# Patient Record
Sex: MALE | Race: BLACK OR AFRICAN AMERICAN | NOT HISPANIC OR LATINO | URBAN - METROPOLITAN AREA
[De-identification: names, ages, dates, MRNs, and addresses within clinical notes are randomized per-mention and may not be internally consistent; named-entity substitution may affect disease eponyms.]

---

## 2020-04-16 ENCOUNTER — EMERGENCY (EMERGENCY)
Facility: HOSPITAL | Age: 30
LOS: 1 days | Discharge: HOME | End: 2020-04-16
Attending: EMERGENCY MEDICINE | Admitting: EMERGENCY MEDICINE
Payer: SELF-PAY

## 2020-04-16 VITALS
DIASTOLIC BLOOD PRESSURE: 69 MMHG | WEIGHT: 154.32 LBS | SYSTOLIC BLOOD PRESSURE: 129 MMHG | OXYGEN SATURATION: 99 % | RESPIRATION RATE: 17 BRPM | HEIGHT: 67 IN | TEMPERATURE: 99 F | HEART RATE: 100 BPM

## 2020-04-16 PROCEDURE — 99283 EMERGENCY DEPT VISIT LOW MDM: CPT

## 2020-04-16 RX ORDER — ACETAMINOPHEN 500 MG
975 TABLET ORAL ONCE
Refills: 0 | Status: COMPLETED | OUTPATIENT
Start: 2020-04-16 | End: 2020-04-16

## 2020-04-16 RX ORDER — PERMETHRIN CREAM 5% W/W 50 MG/G
1 CREAM TOPICAL ONCE
Refills: 0 | Status: COMPLETED | OUTPATIENT
Start: 2020-04-16 | End: 2020-04-16

## 2020-04-16 RX ORDER — PERMETHRIN CREAM 5% W/W 50 MG/G
1 CREAM TOPICAL
Qty: 1 | Refills: 0
Start: 2020-04-16

## 2020-04-16 RX ADMIN — Medication 975 MILLIGRAM(S): at 18:03

## 2020-04-16 RX ADMIN — PERMETHRIN CREAM 5% W/W 1 APPLICATION(S): 50 CREAM TOPICAL at 18:03

## 2020-04-16 NOTE — ED PROVIDER NOTE - NSFOLLOWUPINSTRUCTIONS_ED_ALL_ED_FT
Please apply permethrin cream and leave on for 8-12h then rinse.     Return to the ER for medical emergencies.

## 2020-04-16 NOTE — ED PROVIDER NOTE - PATIENT PORTAL LINK FT
You can access the FollowMyHealth Patient Portal offered by [facility] by registering at the following website: [email or web address]. By joining [employer]’s FollowMyHealth portal, you will also be able to view your health information using other applications (apps) compatible with our system.
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
## 2020-04-20 DIAGNOSIS — L29.9 PRURITUS, UNSPECIFIED: ICD-10-CM

## 2020-04-20 DIAGNOSIS — Z59.0 HOMELESSNESS: ICD-10-CM

## 2020-04-20 SDOH — ECONOMIC STABILITY - HOUSING INSECURITY: HOMELESSNESS: Z59.0

## 2020-04-28 ENCOUNTER — EMERGENCY (EMERGENCY)
Facility: HOSPITAL | Age: 30
LOS: 0 days | Discharge: HOME | End: 2020-04-28
Attending: EMERGENCY MEDICINE
Payer: SELF-PAY

## 2020-04-28 VITALS
OXYGEN SATURATION: 97 % | WEIGHT: 169.98 LBS | RESPIRATION RATE: 16 BRPM | HEIGHT: 73 IN | HEART RATE: 105 BPM | DIASTOLIC BLOOD PRESSURE: 66 MMHG | SYSTOLIC BLOOD PRESSURE: 128 MMHG | TEMPERATURE: 100 F

## 2020-04-28 PROCEDURE — 99284 EMERGENCY DEPT VISIT MOD MDM: CPT
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
## 2020-04-28 NOTE — ED PROVIDER NOTE - MUSCULOSKELETAL, MLM
Spine appears normal, range of motion is not limited, no muscle or joint tenderness. no evident bony deformities or edema

## 2020-04-28 NOTE — ED PROVIDER NOTE - PATIENT PORTAL LINK FT
You can access the FollowMyHealth Patient Portal offered by [facility] by registering at the following website: [email or web address]. By joining [employer]’s FollowMyHealth portal, you will also be able to view your health information using other applications (apps) compatible with our system.

## 2020-04-28 NOTE — ED PROVIDER NOTE - NSFOLLOWUPINSTRUCTIONS_ED_ALL_ED_FT
You can take tylenol 650mg every 8 hours as needed for pain.   Return to ED if you experience: high fevers, chest pain, shortness of breath and other concerning symptoms.

## 2020-04-28 NOTE — ED PROVIDER NOTE - CLINICAL SUMMARY MEDICAL DECISION MAKING FREE TEXT BOX
30yo male presents complaining of full body pain x <1hr. Pt appears intoxicated. No evidence of injury. Follow commands, answer appropriately. Ambulatory. Will wait for clinical sobriety.

## 2020-04-28 NOTE — ED PROVIDER NOTE - ATTENDING CONTRIBUTION TO CARE
I have seen the patient with the PA and agree with above examination and assessment and plan with the following addendum:        Focused PE:   General: NAD, alert and oriented  Head: Normocephalic, atraumatic  Eyes: PERRLA, EOMI  Cardiac: RRR, no murmurs, rubs or gallops  Resp: CTA, no wheezes, rales or ronchi  GI: Nondistended, nontender, no rebound or guarding  Neuro: Alert and oriented, no focal deficits. Normal gait. Clinically sober.  Ext: Non edematous, nontender.

## 2020-04-29 ENCOUNTER — EMERGENCY (EMERGENCY)
Facility: HOSPITAL | Age: 30
LOS: 1 days | Discharge: HOME | End: 2020-04-29
Attending: EMERGENCY MEDICINE | Admitting: EMERGENCY MEDICINE
Payer: SELF-PAY

## 2020-04-29 VITALS
SYSTOLIC BLOOD PRESSURE: 151 MMHG | WEIGHT: 145.06 LBS | HEART RATE: 96 BPM | RESPIRATION RATE: 16 BRPM | OXYGEN SATURATION: 98 % | DIASTOLIC BLOOD PRESSURE: 82 MMHG | TEMPERATURE: 98 F

## 2020-04-29 DIAGNOSIS — Z00.00 ENCOUNTER FOR GENERAL ADULT MEDICAL EXAMINATION WITHOUT ABNORMAL FINDINGS: ICD-10-CM

## 2020-04-29 DIAGNOSIS — F10.10 ALCOHOL ABUSE, UNCOMPLICATED: ICD-10-CM

## 2020-04-29 PROCEDURE — 99283 EMERGENCY DEPT VISIT LOW MDM: CPT

## 2020-04-29 RX ORDER — PERMETHRIN CREAM 5% W/W 50 MG/G
1 CREAM TOPICAL ONCE
Refills: 0 | Status: COMPLETED | OUTPATIENT
Start: 2020-04-29 | End: 2020-04-29

## 2020-04-29 RX ADMIN — PERMETHRIN CREAM 5% W/W 1 APPLICATION(S): 50 CREAM TOPICAL at 16:53

## 2020-04-29 NOTE — ED PROVIDER NOTE - CLINICAL SUMMARY MEDICAL DECISION MAKING FREE TEXT BOX
30 yo homeless M with PMH of HTN presents for "cream" to treat scabies and a shower. No fevers, chills, cough. SOB. No other complaints. Pt given access to shower and given permethrin cream to take with him.

## 2020-04-29 NOTE — ED PROVIDER NOTE - PATIENT PORTAL LINK FT
You can access the FollowMyHealth Patient Portal offered by [facility] by registering at the following website: [email or web address]. By joining [employer]’s FollowMyHealth portal, you will also be able to view your health information using other applications (apps) compatible with our system.

## 2020-04-29 NOTE — ED ADULT NURSE NOTE - CHIEF COMPLAINT QUOTE
pt BIBA from [place] at [address] requesting med refill "for my scabies. I've had them for 6-7 months but I didn't clean them right so I've been itching all over"

## 2020-04-29 NOTE — ED PROVIDER NOTE - NSFOLLOWUPINSTRUCTIONS_ED_ALL_ED_FT
Scabies, Adult     Scabies is a skin condition that happens when very small insects get under the skin (infestation). This causes a rash and severe itchiness. Scabies can spread from person to person (is contagious). If you get scabies, it is common for others in your household to get scabies too.  With proper treatment, symptoms usually go away in 2–4 weeks. Scabies usually does not cause lasting problems.  What are the causes?  This condition is caused by tiny mites (Sarcoptes scabiei, or human itch mites) that can only be seen with a microscope. The mites get into the top layer of skin and lay eggs. Scabies can spread from person to person through:  Close contact with a person who has scabies.Sharing or having contact with infested items, such as towels, bedding, or clothing.What increases the risk?  The following factors may make you more likely to develop this condition:  Living in a nursing home or other extended care facility.Having sexual contact with a partner who has scabies.Caring for others who are at increased risk for scabies.What are the signs or symptoms?  Symptoms of this condition include:  Severe itchiness. This is often worse at night.A rash that includes tiny red bumps or blisters. The rash commonly occurs on the hands, wrists, elbows, armpits, chest, waist, groin, or buttocks. The bumps may form a line (burrow) in some areas.Skin irritation. This can include scaly patches or sores.How is this diagnosed?  This condition may be diagnosed based on:  A physical exam of the skin.A skin test. Your health care provider may take a sample of your affected skin (skin scraping) and have it examined under a microscope for signs of mites.How is this treated?  This condition may be treated with:  Medicated cream or lotion that kills the mites. This is spread on the entire body and left on for several hours. Usually, one treatment with medicated cream or lotion is enough to kill all the mites. In severe cases, the treatment may need to be repeated.Medicated cream that relieves itching.Medicines taken by mouth (orally) that:  Relieve itching.Reduce the swelling and redness.Kill the mites. This treatment may be done in severe cases.Follow these instructions at home:  Medicines        Take or apply over-the-counter and prescription medicines as told by your health care provider.Apply medicated cream or lotion as told by your health care provider.Do not wash off the medicated cream or lotion until the necessary amount of time has passed.Skin care        Avoid scratching the affected areas of your skin.Keep your fingernails closely trimmed to reduce injury from scratching.Take cool baths or apply cool washcloths to your skin to help reduce itching.General instructions     Clean all items that you recently had contact with, including bedding, clothing, and furniture. Do this on the same day that you start treatment.  Dry clean items, or use hot water to wash items. Dry items on the hot dry cycle.Place items that cannot be washed into closed, airtight plastic bags for at least 3 days. The mites cannot live for more than 3 days away from human skin.Vacuum furniture and mattresses that you use.Make sure that other people who may have been infested are examined by a health care provider. These include members of your household and anyone who may have had contact with infested items.Keep all follow-up visits as told by your health care provider. This is important.Contact a health care provider if:  You have itching that does not go away after 4 weeks of treatment.You continue to develop new bumps or burrows.You have redness, swelling, or pain in your rash area after treatment.You have fluid, blood, or pus coming from your rash.Summary  Scabies is a skin condition that causes a rash and severe itchiness.This condition is caused by tiny mites that get into the top layer of the skin and lay eggs.Scabies can spread from person to person.Follow treatments as recommended by your health care provider.Clean all items that you recently had contact with.This information is not intended to replace advice given to you by your health care provider. Make sure you discuss any questions you have with your health care provider.

## 2020-04-29 NOTE — ED ADULT TRIAGE NOTE - CHIEF COMPLAINT QUOTE
pt BIBA from [place] at [address] requesting med refill "for my scabies. I've had them for 6-7 months but I didn't clean them right so I've been itching all over"

## 2020-04-29 NOTE — ED PROVIDER NOTE - OBJECTIVE STATEMENT
30 yo homeless M with PMH of HTN presents for "cream" to treat scabies and a shower. No fevers, chills, cough. SOB. No other complaints.

## 2020-05-03 DIAGNOSIS — B86 SCABIES: ICD-10-CM

## 2020-05-03 DIAGNOSIS — Z76.0 ENCOUNTER FOR ISSUE OF REPEAT PRESCRIPTION: ICD-10-CM

## 2022-03-05 NOTE — ED PROVIDER NOTE - NSCAREINITIATED _GEN_ER
Chiquita Sorto(Attending)
please follow up with your doctor in 2-3 days.   take medications as prescribed.    drink plenty of fluids.   may use ice or heating pads for comfort.   return to ED for any concerns

## 2023-09-20 ENCOUNTER — EMERGENCY (EMERGENCY)
Facility: HOSPITAL | Age: 33
LOS: 1 days | Discharge: HOME | End: 2023-09-20
Attending: EMERGENCY MEDICINE | Admitting: EMERGENCY MEDICINE
Payer: MEDICAID

## 2023-09-20 VITALS
RESPIRATION RATE: 18 BRPM | SYSTOLIC BLOOD PRESSURE: 100 MMHG | DIASTOLIC BLOOD PRESSURE: 67 MMHG | OXYGEN SATURATION: 99 % | TEMPERATURE: 98 F | HEART RATE: 72 BPM

## 2023-09-20 PROCEDURE — 99283 EMERGENCY DEPT VISIT LOW MDM: CPT

## 2023-09-20 RX ORDER — PERMETHRIN CREAM 5% W/W 50 MG/G
1 CREAM TOPICAL ONCE
Refills: 0 | Status: COMPLETED | OUTPATIENT
Start: 2023-09-20 | End: 2023-09-20

## 2023-09-20 RX ADMIN — PERMETHRIN CREAM 5% W/W 1 APPLICATION(S): 50 CREAM TOPICAL at 18:57

## 2023-09-20 NOTE — ED ADULT NURSE NOTE - OBJECTIVE STATEMENT
Patient presents to ED c/o rash to body and atraumatic pain to hands and feet. Denies cp, sob, cough. Patient aox4, ambulatory with steady gait, spont unlabored breathing on ra. Patient given food as per request.

## 2023-09-20 NOTE — ED PROVIDER NOTE - NSFOLLOWUPINSTRUCTIONS_ED_ALL_ED_FT
-PLEASE FOLLOW-UP WITH YOUR PRIMARY CARE DOCTOR IN 1-2 DAYS.  BRING ALL PAPERWORK FROM TODAY'S VISIT TO YOUR FOLLOW-UP VISIT.    -APPLY CREAM TO ENTIRE BODY AND LET REST FOR 8 HOURS.  AFTER THAT SHOWER CREAM OFF.  IF ITCHING PERSIST, PLEASE RETURN FOR RE-CHECK.  -PLEASE RETURN TO THE ER IMMEDIATELY OR CALL 911 FOR ANY HIGH FEVER, TROUBLE BREATHING, VOMITING, SEVERE PAIN, OR ANY OTHER CONCERNS.

## 2023-09-20 NOTE — ED ADULT NURSE NOTE - NSFALLUNIVINTERV_ED_ALL_ED
Bed/Stretcher in lowest position, wheels locked, appropriate side rails in place/Call bell, personal items and telephone in reach/Instruct patient to call for assistance before getting out of bed/chair/stretcher/Non-slip footwear applied when patient is off stretcher/[place] to call system/Physically safe environment - no spills, clutter or unnecessary equipment/Purposeful proactive rounding/Room/bathroom lighting operational, light cord in reach

## 2023-09-20 NOTE — ED PROVIDER NOTE - PATIENT PORTAL LINK FT
You can access the FollowMyHealth Patient Portal offered by [facility] by registering at the following website: [email or web address]. By joining [employer]’s FollowMyHealth portal, you will also be able to view your health information using other applications (apps) compatible with our system.

## 2023-09-20 NOTE — ED PROVIDER NOTE - PHYSICAL EXAMINATION
VITAL SIGNS: I have reviewed nursing notes and confirm.  CONST: Well-developed; well-nourished; No apparent distress.  ENT: No nasal discharge; airway clear.  EYES: Sclera clear. Pupils round and symmetrical bilaterally.  RESP: Breathing comfortably; speaking in full sentences.   MSK: No acute deformities noted to extremities. No tenderness to cervical/thoracic/lumbar spine to palpation.  NEURO: Alert, oriented. Speech is fluent and appropriate. Moving all extremities appropriately. No gross motor or sensory abnormalities.  SKIN: Rash c/w scabies infestation.   PSYCH: Cooperative. Appropriate mood, language, and behavior.

## 2023-09-20 NOTE — ED PROVIDER NOTE - OBJECTIVE STATEMENT
Pt is a 32yo M who p/w rash and itching to hands and groin x several days.  Pt with h/o scabies and reports it may be the same.  Pt placed in exam room and immediately went to bathroom where he stayed for prolonged period.  Checked on many times and appears pt came in to use shower and clean himself up.  Requesting permethrin cream.

## 2023-09-22 DIAGNOSIS — R21 RASH AND OTHER NONSPECIFIC SKIN ERUPTION: ICD-10-CM

## 2023-09-22 DIAGNOSIS — M79.643 PAIN IN UNSPECIFIED HAND: ICD-10-CM

## 2023-09-22 DIAGNOSIS — Z88.8 ALLERGY STATUS TO OTHER DRUGS, MEDICAMENTS AND BIOLOGICAL SUBSTANCES: ICD-10-CM

## 2023-09-26 ENCOUNTER — EMERGENCY (EMERGENCY)
Facility: HOSPITAL | Age: 33
LOS: 1 days | Discharge: HOME | End: 2023-09-26
Attending: EMERGENCY MEDICINE | Admitting: EMERGENCY MEDICINE
Payer: MEDICAID

## 2023-09-26 VITALS
OXYGEN SATURATION: 100 % | RESPIRATION RATE: 19 BRPM | DIASTOLIC BLOOD PRESSURE: 79 MMHG | HEART RATE: 102 BPM | SYSTOLIC BLOOD PRESSURE: 109 MMHG | WEIGHT: 154.98 LBS | TEMPERATURE: 98 F

## 2023-09-26 VITALS
OXYGEN SATURATION: 96 % | RESPIRATION RATE: 18 BRPM | TEMPERATURE: 98 F | HEART RATE: 85 BPM | SYSTOLIC BLOOD PRESSURE: 127 MMHG | DIASTOLIC BLOOD PRESSURE: 84 MMHG

## 2023-09-26 PROCEDURE — 73620 X-RAY EXAM OF FOOT: CPT | Mod: 26,LT

## 2023-09-26 PROCEDURE — 71045 X-RAY EXAM CHEST 1 VIEW: CPT | Mod: 26

## 2023-09-26 PROCEDURE — 99284 EMERGENCY DEPT VISIT MOD MDM: CPT

## 2023-09-26 PROCEDURE — 73130 X-RAY EXAM OF HAND: CPT | Mod: 26,LT

## 2023-09-26 RX ORDER — IBUPROFEN 200 MG
600 TABLET ORAL ONCE
Refills: 0 | Status: COMPLETED | OUTPATIENT
Start: 2023-09-26 | End: 2023-09-26

## 2023-09-26 RX ORDER — ACETAMINOPHEN 500 MG
975 TABLET ORAL ONCE
Refills: 0 | Status: COMPLETED | OUTPATIENT
Start: 2023-09-26 | End: 2023-09-26

## 2023-09-26 RX ADMIN — Medication 975 MILLIGRAM(S): at 13:12

## 2023-09-26 RX ADMIN — Medication 600 MILLIGRAM(S): at 13:12

## 2023-09-26 NOTE — ED PROVIDER NOTE - CARE PROVIDER_API CALL
Vilma Rapp  Surgery of the Hand  [address], [address]  [place] [address]  Phone: [phone]  Fax: [phone]  Follow Up Time: 1-3 Days

## 2023-09-26 NOTE — ED PROVIDER NOTE - PATIENT PORTAL LINK FT
You can access the FollowMyHealth Patient Portal offered by [facility] by registering at the following website: [email or web address]. By joining [employer]’s FollowMyHealth portal, you will also be able to view your health information using other applications (apps) compatible with our system.
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
## 2023-09-26 NOTE — ED PROVIDER NOTE - PHYSICAL EXAMINATION
GENERAL: NAD  HEENT:  Atraumatic  CHEST/LUNG: Chest rise equal bilaterally  HEART: Regular rate and rhythm  ABDOMEN: Soft, Nontender, Nondistended  EXTREMITIES:  Extremities warm  PSYCH: A&Ox3  NEUROLOGY: strength and sensation intact in all extremities. Ambulatory without difficulty.   LEFT HAND: Rash consistent w/ prior scabies, no warmth, no erythema, no TTP  LEFT FOOT: No erythema, no TTP.

## 2023-09-26 NOTE — ED PROVIDER NOTE - NSFOLLOWUPCLINICS_GEN_ALL_ED_FT
[facility]  Orthopedic Surgery  [address] [address]  Phone: [phone]  Fax:   Follow Up Time: 1-3 Days

## 2023-09-26 NOTE — ED PROVIDER NOTE - NSFOLLOWUPINSTRUCTIONS_ED_ALL_ED_FT
Musculoskeletal Pain    Your pain is likely due to musculoskeletal reasons. Take ibuprofen and tylenol as labeled on the pill bottles. Use ice to alleviate the pain (20 minutes on, 20 minutes off). Do not put ice direct on the skin.    Return to the ED if you experience redness, warmth, increased swelling, chest pain, shortness of breath, or any other alarming symptoms. Musculoskeletal Pain    Your pain is likely due to musculoskeletal reasons. Take ibuprofen and tylenol as labeled on the pill bottles. Use ice to alleviate the pain (20 minutes on, 20 minutes off). Do not put ice direct on the skin.    Return to the ED if you experience redness, warmth, increased swelling, chest pain, shortness of breath, or any other alarming symptoms.    The results of any blood tests and imaging studies completed during your visit today were discussed and explained to you and a copy provided with your discharge instructions. Please follow up with your primary care doctor, orthopedics doctor, and hand surgeon within 48 hours for your hand fracture/dislocation.
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
## 2023-09-26 NOTE — ED PROVIDER NOTE - CLINICAL SUMMARY MEDICAL DECISION MAKING FREE TEXT BOX
34 y/o male w/ PMH HTN c/o 3 week history of localized left hand and left foot pain for 3 weeks. Pt admits to smoking a cigarette which may or may not have drugs in it. Pt asked to use the restroom to wipe himself off. Pt also admits to having a "2 and a half cold" a week ago and "1 and a half" mild chest tightness associated w/ the cold. When asked to elaborate, pt stated that the numbers describe the intensity of the symptoms. Otherwise asymptomatic. Denies fevers, chills, nausea, vomiting, dizziness, SOB, abdominal pain, dysuria, hematuria. Pt is otherwise young, will obtain EKG and CXR at this time. XR of affected pain in extremities and reassess w/ pain control and likely d/c w/ close PCP f/u. Low concern for acute infection, fx, or dislocations at this time. 34 y/o male w/ PMH HTN c/o 3 week history of localized left hand and left foot pain for 3 weeks. Pt admits to smoking a cigarette which may or may not have drugs in it. Pt asked to use the restroom to wipe himself off. Pt also admits to having a "2 and a half cold" a week ago. When asked to elaborate, pt stated that the numbers describe the intensity of the symptoms. Otherwise asymptomatic. Denies fevers, chills, nausea, vomiting, dizziness, SOB, abdominal pain, dysuria, hematuria. Pt is otherwise young, will obtain EKG and CXR at this time for initial tachycardia. XR of affected pain in extremities and reassess w/ pain control and likely d/c w/ close PCP f/u. Low concern for acute infection, fx, or dislocations at this time.

## 2023-09-26 NOTE — ED PROVIDER NOTE - OBJECTIVE STATEMENT
34 y/o male w/ PMH HTN c/o 3 week history of localized left hand and left foot pain for 3 weeks. Pt admits to smoking a cigarette which may or may not have drugs in it. Pt asked to use the restroom to wipe himself off. Pt also admits to having a "2 and a half cold" a week ago and "1 and a half" mild chest tightness associated w/ the cold. When asked to elaborate, pt stated that the numbers describe the intensity of the symptoms. Otherwise asymptomatic. Denies fevers, chills, nausea, vomiting, dizziness, SOB, abdominal pain, dysuria, hematuria. 32 y/o male w/ PMH HTN c/o 3 week history of localized left hand and left foot pain for 3 weeks. Pt admits to smoking a cigarette which may or may not have drugs in it. Pt asked to use the restroom to wipe himself off. Pt also admits to having a "2 and a half cold" a week ago. When asked to elaborate, pt stated that the numbers describe the intensity of the symptoms. Otherwise asymptomatic. Denies fevers, chills, nausea, vomiting, dizziness, SOB, abdominal pain, dysuria, hematuria.

## 2023-09-26 NOTE — ED ADULT TRIAGE NOTE - CHIEF COMPLAINT QUOTE
BIBEMS for left hand/foot pain x 3 weeks. Pt ambulatory. states he smoked a cigarette which may or may not have had drugs in it. pt alert and orient.

## 2023-09-26 NOTE — ED ADULT NURSE NOTE - OBJECTIVE STATEMENT
Patient presents to ED c/o left hand/foot pain x 3 weeks. Denies trauma. Additionally c/o mild cough x 2 weeks. Denies cp, sob. Patient ambulatory with steady gait.

## 2023-09-26 NOTE — ED ADULT TRIAGE NOTE - WEIGHT IN LBS
patient w/ intermittent symptoms for the past two months pmd in [place] no cardiology follow-up will draw blood work, and continue to monitor
154.9

## 2023-09-29 DIAGNOSIS — R00.0 TACHYCARDIA, UNSPECIFIED: ICD-10-CM

## 2023-09-29 DIAGNOSIS — Z91.041 RADIOGRAPHIC DYE ALLERGY STATUS: ICD-10-CM

## 2023-09-29 DIAGNOSIS — F17.210 NICOTINE DEPENDENCE, CIGARETTES, UNCOMPLICATED: ICD-10-CM

## 2023-09-29 DIAGNOSIS — M79.642 PAIN IN LEFT HAND: ICD-10-CM

## 2023-09-29 DIAGNOSIS — M79.672 PAIN IN LEFT FOOT: ICD-10-CM

## 2023-09-29 DIAGNOSIS — Z88.8 ALLERGY STATUS TO OTHER DRUGS, MEDICAMENTS AND BIOLOGICAL SUBSTANCES: ICD-10-CM

## 2023-09-29 DIAGNOSIS — I10 ESSENTIAL (PRIMARY) HYPERTENSION: ICD-10-CM

## 2023-09-30 ENCOUNTER — EMERGENCY (EMERGENCY)
Facility: HOSPITAL | Age: 33
LOS: 1 days | Discharge: HOME | End: 2023-09-30
Attending: EMERGENCY MEDICINE | Admitting: EMERGENCY MEDICINE
Payer: SELF-PAY

## 2023-09-30 VITALS
HEIGHT: 68 IN | RESPIRATION RATE: 18 BRPM | SYSTOLIC BLOOD PRESSURE: 105 MMHG | TEMPERATURE: 99 F | OXYGEN SATURATION: 97 % | WEIGHT: 149.91 LBS | HEART RATE: 90 BPM | DIASTOLIC BLOOD PRESSURE: 79 MMHG

## 2023-09-30 PROCEDURE — 99283 EMERGENCY DEPT VISIT LOW MDM: CPT

## 2023-09-30 PROCEDURE — 99282 EMERGENCY DEPT VISIT SF MDM: CPT
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
## 2023-09-30 NOTE — ED PROVIDER NOTE - PATIENT PORTAL LINK FT
You can access the FollowMyHealth Patient Portal offered by [facility] by registering at the following website: [email or web address]. By joining [employer]’s FollowMyHealth portal, you will also be able to view your health information using other applications (apps) compatible with our system.
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
## 2023-09-30 NOTE — ED ADULT TRIAGE NOTE - CHIEF COMPLAINT QUOTE
Pt c/o feet and hand pain for several months. No pain meds PTA. Pt has pmhx of schizophrenia and currently not taking any medications. Pt denies any SI/HI.

## 2023-09-30 NOTE — ED ADULT NURSE NOTE - NSFALLUNIVINTERV_ED_ALL_ED
Bed/Stretcher in lowest position, wheels locked, appropriate side rails in place/Call bell, personal items and telephone in reach/Instruct patient to call for assistance before getting out of bed/chair/stretcher/Non-slip footwear applied when patient is off stretcher/[place] to call system/Physically safe environment - no spills, clutter or unnecessary equipment/Purposeful proactive rounding/Room/bathroom lighting operational, light cord in reach
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
## 2023-09-30 NOTE — ED PROVIDER NOTE - PHYSICAL EXAMINATION
CONSTITUTIONAL: Well-appearing; well-nourished; in no apparent distress.   HEAD: Normocephalic; atraumatic.   EYES:  conjunctiva and sclera clear  ENT: normal nose; no rhinorrhea;  NECK: Supple; full ROM  RESPIRATORY: Breathing easily; no resp difficulty  EXT: No cyanosis or edema; no breaks in skin  SKIN: Normal for age and race; warm; dry; good turgor; no apparent lesions or rash.   NEURO: A & O x 3; face symmetric; grossly unremarkable.   PSYCHOLOGICAL: does not appear to be responding to internal stimuli, quietly aggressive

## 2023-09-30 NOTE — ED ADULT NURSE NOTE - PRIMARY CARE PROVIDER
You Patient resolved from the Care Transitions episode on 05/10/21. Discussed COVID-19 related testing which was available at this time. Test results were positive. Patient informed of results, if available? Yes    Patient/family has been provided the following resources and education related to COVID-19:                         Signs, symptoms and red flags related to COVID-19            [facility] exposure and quarantine guidelines            [facility] exposure contact - [phone]            Contact for their local Department of Health                 Patient currently reports that the following symptoms have improved:  cough, shortness of breath, body aches, chills, fatigue. Spoke with patient via [facility]  #[id number]. Patient stated that he is feeling much better. He has finished the antibiotics. All his symptoms have resolved and he is back to work. Patient had no further questions or concerns. No further outreach scheduled with this CTN/ACM. Episode of Care resolved. Patient has this CTN/ACM contact information if future needs arise.
non affiliated

## 2023-10-02 DIAGNOSIS — R45.6 VIOLENT BEHAVIOR: ICD-10-CM

## 2023-10-02 DIAGNOSIS — M79.641 PAIN IN RIGHT HAND: ICD-10-CM

## 2023-10-02 DIAGNOSIS — M79.642 PAIN IN LEFT HAND: ICD-10-CM

## 2023-10-02 DIAGNOSIS — M79.604 PAIN IN RIGHT LEG: ICD-10-CM

## 2023-10-02 DIAGNOSIS — Z88.8 ALLERGY STATUS TO OTHER DRUGS, MEDICAMENTS AND BIOLOGICAL SUBSTANCES: ICD-10-CM

## 2023-10-02 DIAGNOSIS — Z59.00 HOMELESSNESS UNSPECIFIED: ICD-10-CM

## 2023-10-02 DIAGNOSIS — M79.605 PAIN IN LEFT LEG: ICD-10-CM

## 2023-10-02 SDOH — ECONOMIC STABILITY - HOUSING INSECURITY: HOMELESSNESS UNSPECIFIED: Z59.00

## 2023-10-05 ENCOUNTER — EMERGENCY (EMERGENCY)
Facility: HOSPITAL | Age: 33
LOS: 1 days | Discharge: HOME | End: 2023-10-05
Admitting: EMERGENCY MEDICINE
Payer: MEDICAID

## 2023-10-05 VITALS
HEIGHT: 68 IN | SYSTOLIC BLOOD PRESSURE: 110 MMHG | RESPIRATION RATE: 18 BRPM | OXYGEN SATURATION: 99 % | DIASTOLIC BLOOD PRESSURE: 80 MMHG | TEMPERATURE: 98 F | HEART RATE: 78 BPM

## 2023-10-05 LAB — GLUCOSE BLDC GLUCOMTR-MCNC: 70 MG/DL — SIGNIFICANT CHANGE UP (ref 70–99)

## 2023-10-05 PROCEDURE — 99283 EMERGENCY DEPT VISIT LOW MDM: CPT

## 2023-10-05 RX ORDER — ACETAMINOPHEN 500 MG
650 TABLET ORAL ONCE
Refills: 0 | Status: COMPLETED | OUTPATIENT
Start: 2023-10-05 | End: 2023-10-05

## 2023-10-05 RX ADMIN — Medication 650 MILLIGRAM(S): at 23:46

## 2023-10-05 NOTE — ED PROVIDER NOTE - OBJECTIVE STATEMENT
34 yo M with no known PMHx, undomiciled, presenting c/o body aches. Pt reports having generalized body aches due to cold exposure a few days ago. Otherwise no associated sx. Denies fever, chills, trauma, fall, rhinorrhea, cough, congestion, CP, SOB, palpitations, N/V, HA, dizziness, focal weakness, change in urinary/bowel function, LOC, and malaise.

## 2023-10-05 NOTE — ED PROVIDER NOTE - PATIENT PORTAL LINK FT
You can access the FollowMyHealth Patient Portal offered by [facility] by registering at the following website: [email or web address]. By joining [employer]’s FollowMyHealth portal, you will also be able to view your health information using other applications (apps) compatible with our system.

## 2023-10-05 NOTE — ED PROVIDER NOTE - NSFOLLOWUPINSTRUCTIONS_ED_ALL_ED_FT
Follow up with your primary care doctor or clinics listed below if you do not have a doctor,    [address] [address]  To make an appointment, call [phone]    [facility]  Address: [facility] [place] [id number]  Appointment Center: [phone] ([phone])     [facility] is a good referral line for crisis and substance abuse help.  [facility] has drop in programs all over the city.    Return to the ER for Emergencies.  Return immediately for any new or worsening symptoms or any new concerns

## 2023-10-05 NOTE — ED PROVIDER NOTE - PHYSICAL EXAMINATION
Gen - Unkempt M, NAD, comfortable and non-toxic appearing, speaking in full sentences   Skin - warm, dry, intact   HEENT - AT/NC, no nasal discharge, airway patent, neck supple and FROM  CV - S1S2, R/R/R  Resp - CTAB, no r/r/w  GI - soft, ND, NT, no CVAT b/l   MS - No acute or gross deformities noted to extremities. No midline spinal tenderness or step off on palpation  Neuro - AxOx3, ambulatory without gait disturbance

## 2023-10-09 DIAGNOSIS — Z59.00 HOMELESSNESS UNSPECIFIED: ICD-10-CM

## 2023-10-09 DIAGNOSIS — M79.10 MYALGIA, UNSPECIFIED SITE: ICD-10-CM

## 2023-10-09 DIAGNOSIS — Z88.8 ALLERGY STATUS TO OTHER DRUGS, MEDICAMENTS AND BIOLOGICAL SUBSTANCES: ICD-10-CM

## 2023-10-09 SDOH — ECONOMIC STABILITY - HOUSING INSECURITY: HOMELESSNESS UNSPECIFIED: Z59.00

## 2023-12-05 NOTE — ED ADULT TRIAGE NOTE - NS ED NURSE DIRECT TO ROOM YN
MRSA.  Recommended bactrim, warm compresses, and hibaclens rinses. Advised to call office if develops fluctuance again and will attempt to drain. -     sulfamethoxazole-trimethoprim (BACTRIM DS;SEPTRA DS) 800-160 MG per tablet; Take 1 tablet by mouth 2 times daily for 10 days, Disp-20 tablet, R-0Normal       Patient informed, we will call with blood work results within one week. If you have not heard regarding results in over a week, please contact office. You can also review results on mychart.            Vivia Severs, MD Yes

## 2023-12-18 NOTE — ED ADULT NURSE NOTE - FINAL NURSING ELECTRONIC SIGNATURE
05-Oct-2023 23:45 Chinmay Knott  Pediatric Surgery  [address] [address] [place] [address], Room [address]  [place] [address]  Phone: [phone]  Fax: [phone]  Follow Up Time: 2 weeks   Chinmay Knott  Pediatric Surgery  [address] [address] [place] [address], Room [address]  [place] [address]  Phone: [phone]  Fax: [phone]  Follow Up Time: 2 weeks

## 2024-02-04 ENCOUNTER — EMERGENCY (EMERGENCY)
Facility: HOSPITAL | Age: 34
LOS: 1 days | Discharge: HOME | End: 2024-02-04
Admitting: EMERGENCY MEDICINE
Payer: SELF-PAY

## 2024-02-04 ENCOUNTER — EMERGENCY (EMERGENCY)
Facility: HOSPITAL | Age: 34
LOS: 1 days | Discharge: HOME | End: 2024-02-04
Attending: EMERGENCY MEDICINE | Admitting: EMERGENCY MEDICINE
Payer: COMMERCIAL

## 2024-02-04 VITALS
RESPIRATION RATE: 15 BRPM | TEMPERATURE: 98 F | SYSTOLIC BLOOD PRESSURE: 135 MMHG | OXYGEN SATURATION: 98 % | HEART RATE: 88 BPM | DIASTOLIC BLOOD PRESSURE: 78 MMHG

## 2024-02-04 VITALS
DIASTOLIC BLOOD PRESSURE: 78 MMHG | HEART RATE: 100 BPM | RESPIRATION RATE: 15 BRPM | SYSTOLIC BLOOD PRESSURE: 140 MMHG | OXYGEN SATURATION: 95 % | TEMPERATURE: 98 F

## 2024-02-04 DIAGNOSIS — R52 PAIN, UNSPECIFIED: ICD-10-CM

## 2024-02-04 DIAGNOSIS — Z88.8 ALLERGY STATUS TO OTHER DRUGS, MEDICAMENTS AND BIOLOGICAL SUBSTANCES STATUS: ICD-10-CM

## 2024-02-04 PROCEDURE — 99283 EMERGENCY DEPT VISIT LOW MDM: CPT

## 2024-02-04 RX ORDER — ACETAMINOPHEN 500 MG
650 TABLET ORAL ONCE
Refills: 0 | Status: COMPLETED | OUTPATIENT
Start: 2024-02-04 | End: 2024-02-04

## 2024-02-04 RX ORDER — ACETAMINOPHEN 500 MG
975 TABLET ORAL ONCE
Refills: 0 | Status: COMPLETED | OUTPATIENT
Start: 2024-02-04 | End: 2024-02-04

## 2024-02-04 RX ADMIN — Medication 975 MILLIGRAM(S): at 20:51

## 2024-02-04 RX ADMIN — Medication 650 MILLIGRAM(S): at 14:32

## 2024-02-04 NOTE — ED ADULT TRIAGE NOTE - CHIEF COMPLAINT QUOTE
patient walk in with EMS [facility]; was just discharged from our facility; same complaint of body pain; chronic in nature; was already given Tylenol and food in ED

## 2024-02-04 NOTE — ED ADULT NURSE REASSESSMENT NOTE - NS ED NURSE REASSESS COMMENT FT1
Pt dc and all education given and gone over with pt. Pt has no further questions and self ambulating out of ed with all belongings and ppwrk. Care complete. Food and drink given to pt as well Pt dc and all education given and gone over with pt. Pt has no further questions and self ambulating out of ed with all belongings and ppwrk. Pt refused dc vs. Care complete. Food and drink given to pt as well

## 2024-02-04 NOTE — ED ADULT TRIAGE NOTE - CHIEF COMPLAINT QUOTE
patient walk in with EMS c/o chronic body pain; aaliyah elaborate; "pain is everywhere"; recently in ED in [place] for same complaint

## 2024-02-04 NOTE — ED ADULT NURSE NOTE - CHIEF COMPLAINT QUOTE
patient walk in with EMS c/o chronic body pain; alaiyah elaborate; "pain is everywhere"; recently in ED in [place] for same complaint

## 2024-02-04 NOTE — ED PROVIDER NOTE - CLINICAL SUMMARY MEDICAL DECISION MAKING FREE TEXT BOX
33-year-old male presents emergency department for body aches.  Patient given Tylenol and discharged.

## 2024-02-04 NOTE — ED ADULT NURSE NOTE - OBJECTIVE STATEMENT
Pt presents to ed biba for cx pain. Pt poor historian and not speaking in sensical way. Pt reported left knee and bilateral hand pain, no known cause. Pt medicated as ordered. All medication education given to patient and pt understands. Plan of care ongoing

## 2024-02-04 NOTE — ED PROVIDER NOTE - OBJECTIVE STATEMENT
33-year-old male presents emergency department for body pain.  Patient is well-known to the emergency department and was recently here.  No falls or trauma.  Patient requesting Tylenol.

## 2024-02-04 NOTE — ED PROVIDER NOTE - CLINICAL SUMMARY MEDICAL DECISION MAKING FREE TEXT BOX
Patient brought in by EMS from [facility] complaining of generalized body aches from many years.  Denies any fall or direct trauma.  Patient is alert and oriented x 3 with steady gait.  Given tylenol. advise follow-up with primary care all precautions reviewed.

## 2024-02-04 NOTE — ED ADULT NURSE NOTE - OBJECTIVE STATEMENT
Pt presented to ed ambulatory, after dc earlier today. Pt reported pain in bilateral hands and left knee. Pt has full ROM of all extremities and CMS intact in all [id number]. HAM clear. Plan of care ongoing

## 2024-02-04 NOTE — ED PROVIDER NOTE - PATIENT PORTAL LINK FT
You can access the FollowMyHealth Patient Portal offered by [facility] by registering at the following website: [email or web address]. By joining [employer]’s FollowMyHealth portal, you will also be able to view your health information using other applications (apps) compatible with our system.
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
## 2024-02-04 NOTE — ED ADULT TRIAGE NOTE - STATUS:
"Pharmacy -- Admission Medication Reconciliation    Prior to admission (PTA) medications were reviewed and the patient's PTA medication list was updated.    Sources Consulted: [place] , sure scripts, chart review, patient interview, Richard MORRISON at [facility]    The reliability of this Medication Reconciliation is: Reliability: Borderline reliable    The following significant changes were made:    Removed omega 3    Removed gabapentin    **pateint takes Erleada in the evening. He did not bring this, is ok missing tonight's dose.    **patient has been taking, on average, 3-5 tablets of Norco per day    **patient thinks he is taking gabapentin, not lyrica.  Per /Richard MORRISON at [facility], there are no fills for gabapentin, only Lyrica.  I left Lyrica on file, removed gabapentin.  Patient states he is taking one capsule one to two times a day but \"definitely one time a day at night\".    In addition, the patient's allergies were reviewed with the patient and updated as follows:   Allergies: Patient has no known allergies.    The pharmacist has reviewed with the patient that all personal medications should be removed from the building or locked in the belongings safe.  Patient shall only take medications ordered by the physician and administered by the nursing staff.       Medication barriers identified: unsure of gabapentin/lyrica   Medication adherence concerns: lyrica last filled 7/28 #90 which is appropriate as patient is taking it 1-2 times per day   Understanding of emergency medications: no narcan at home    Kimmie Rose [facility], 11/16/2021,  11:31 AM     "
Applied

## 2024-02-04 NOTE — ED PROVIDER NOTE - PATIENT PORTAL LINK FT
You can access the FollowMyHealth Patient Portal offered by [facility] by registering at the following website: [email or web address]. By joining [employer]’s FollowMyHealth portal, you will also be able to view your health information using other applications (apps) compatible with our system.

## 2024-02-04 NOTE — ED PROVIDER NOTE - OBJECTIVE STATEMENT
Patient undomiciled with frequent ED visits for various complaints presents complaining of body aches generalized for over a year.  Denies any fall denies any trauma.

## 2024-02-05 PROBLEM — Z78.9 OTHER SPECIFIED HEALTH STATUS: Chronic | Status: ACTIVE | Noted: 2023-10-05

## 2024-02-07 ENCOUNTER — EMERGENCY (EMERGENCY)
Facility: HOSPITAL | Age: 34
LOS: 1 days | Discharge: HOME | End: 2024-02-07
Attending: EMERGENCY MEDICINE | Admitting: EMERGENCY MEDICINE
Payer: COMMERCIAL

## 2024-02-07 VITALS
HEART RATE: 93 BPM | DIASTOLIC BLOOD PRESSURE: 87 MMHG | RESPIRATION RATE: 18 BRPM | OXYGEN SATURATION: 98 % | TEMPERATURE: 99 F | SYSTOLIC BLOOD PRESSURE: 134 MMHG

## 2024-02-07 DIAGNOSIS — Z13.9 ENCOUNTER FOR SCREENING, UNSPECIFIED: ICD-10-CM

## 2024-02-07 DIAGNOSIS — Z59.00 HOMELESSNESS UNSPECIFIED: ICD-10-CM

## 2024-02-07 DIAGNOSIS — R46.0 VERY LOW LEVEL OF PERSONAL HYGIENE: ICD-10-CM

## 2024-02-07 DIAGNOSIS — Z88.8 ALLERGY STATUS TO OTHER DRUGS, MEDICAMENTS AND BIOLOGICAL SUBSTANCES: ICD-10-CM

## 2024-02-07 PROBLEM — Z78.9 OTHER SPECIFIED HEALTH STATUS: Chronic | Status: INACTIVE | Noted: 2020-04-16 | Resolved: 2023-10-05

## 2024-02-07 PROCEDURE — 99283 EMERGENCY DEPT VISIT LOW MDM: CPT

## 2024-02-07 PROCEDURE — 99053 MED SERV 10PM-8AM 24 HR FAC: CPT

## 2024-02-07 RX ORDER — ACETAMINOPHEN 500 MG
975 TABLET ORAL ONCE
Refills: 0 | Status: COMPLETED | OUTPATIENT
Start: 2024-02-07 | End: 2024-02-07

## 2024-02-07 RX ADMIN — Medication 975 MILLIGRAM(S): at 04:30

## 2024-02-07 SDOH — ECONOMIC STABILITY - HOUSING INSECURITY: HOMELESSNESS UNSPECIFIED: Z59.00

## 2024-02-07 NOTE — ED PROVIDER NOTE - CLINICAL SUMMARY MEDICAL DECISION MAKING FREE TEXT BOX
Patient with unknown past medical history poor historian likely undomiciled who presents with no acute medical complaints eating and sleeping comfortably in exam chair will discharge this morning.  Patient is otherwise clinically stable with stable vital signs he is not hypertensive

## 2024-02-07 NOTE — ED ADULT NURSE NOTE - CHIEF COMPLAINT QUOTE
biba from [place] for headache. Pt is concerned for high bp, endorses hx htn and has not had his meds x 3 months. Pt normotensive at triage.

## 2024-02-07 NOTE — ED ADULT TRIAGE NOTE - CHIEF COMPLAINT QUOTE
biba from [place] for headache. Pt is concerned for high bp, endorses hx htn and has not had his meds x 3 months. Pt normotensive at triage.

## 2024-02-07 NOTE — ED ADULT NURSE NOTE - OBJECTIVE STATEMENT
34 y/o male [facility] ambulatory c/o headache. patient is alert verbal oriented x3 able to make needs known. patient medicated as per MD orders. pending reevaluation. VSS

## 2024-02-07 NOTE — ED PROVIDER NOTE - NEUROLOGICAL LEVEL OF CONSCIOUSNESS
Leave voice message for patient to contact our office to schedule an appointment with Dr Lamont Milton, to discuss possible surgery for liver cyst.
Patient called to follow up on liver cyst. Wants call back.
biba via PCA from [facility], staff reported to ems dramatic mood swings, anger.
alert/follows commands

## 2024-02-07 NOTE — ED ADULT NURSE NOTE - NSFALLUNIVINTERV_ED_ALL_ED
Bed/Stretcher in lowest position, wheels locked, appropriate side rails in place/Call bell, personal items and telephone in reach/Instruct patient to call for assistance before getting out of bed/chair/stretcher/Non-slip footwear applied when patient is off stretcher/[place] to call system/Physically safe environment - no spills, clutter or unnecessary equipment/Purposeful proactive rounding/Room/bathroom lighting operational, light cord in reach

## 2024-02-07 NOTE — ED PROVIDER NOTE - OBJECTIVE STATEMENT
33-year-old male unknown past medical history per patient he has a history of high blood pressure and he is on every possible medication for blood pressure, patient is undomiciled otherwise unable to confirm past medical history who presents with a request for a place to sleep and a meal.  He denies active headache denies recent falls denies chest pain shortness of breath or abdominal pain.  Patient is a poor historian

## 2024-02-07 NOTE — ED PROVIDER NOTE - PATIENT PORTAL LINK FT
You can access the FollowMyHealth Patient Portal offered by [facility] by registering at the following website: [email or web address]. By joining [employer]’s FollowMyHealth portal, you will also be able to view your health information using other applications (apps) compatible with our system.

## 2024-02-08 DIAGNOSIS — Z59.00 HOMELESSNESS UNSPECIFIED: ICD-10-CM

## 2024-02-08 DIAGNOSIS — Z88.8 ALLERGY STATUS TO OTHER DRUGS, MEDICAMENTS AND BIOLOGICAL SUBSTANCES: ICD-10-CM

## 2024-02-08 DIAGNOSIS — R52 PAIN, UNSPECIFIED: ICD-10-CM

## 2024-02-08 SDOH — ECONOMIC STABILITY - HOUSING INSECURITY: HOMELESSNESS UNSPECIFIED: Z59.00
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
